# Patient Record
Sex: MALE | Race: OTHER
[De-identification: names, ages, dates, MRNs, and addresses within clinical notes are randomized per-mention and may not be internally consistent; named-entity substitution may affect disease eponyms.]

---

## 2018-09-26 ENCOUNTER — HOSPITAL ENCOUNTER (EMERGENCY)
Dept: HOSPITAL 72 - EMR | Age: 43
Discharge: HOME | End: 2018-09-26
Payer: SELF-PAY

## 2018-09-26 VITALS — WEIGHT: 178 LBS | BODY MASS INDEX: 26.98 KG/M2 | HEIGHT: 68 IN

## 2018-09-26 VITALS — DIASTOLIC BLOOD PRESSURE: 79 MMHG | SYSTOLIC BLOOD PRESSURE: 128 MMHG

## 2018-09-26 DIAGNOSIS — L03.116: Primary | ICD-10-CM

## 2018-09-26 LAB
ADD MANUAL DIFF: NO
ANION GAP SERPL CALC-SCNC: 4 MMOL/L (ref 5–15)
BASOPHILS NFR BLD AUTO: 0.7 % (ref 0–2)
BUN SERPL-MCNC: 15 MG/DL (ref 7–18)
CALCIUM SERPL-MCNC: 8.8 MG/DL (ref 8.5–10.1)
CHLORIDE SERPL-SCNC: 100 MMOL/L (ref 98–107)
CO2 SERPL-SCNC: 34 MMOL/L (ref 21–32)
CREAT SERPL-MCNC: 1 MG/DL (ref 0.55–1.3)
EOSINOPHIL NFR BLD AUTO: 2.7 % (ref 0–3)
ERYTHROCYTE [DISTWIDTH] IN BLOOD BY AUTOMATED COUNT: 10.8 % (ref 11.6–14.8)
HCT VFR BLD CALC: 46.2 % (ref 42–52)
HGB BLD-MCNC: 16 G/DL (ref 14.2–18)
LYMPHOCYTES NFR BLD AUTO: 16.9 % (ref 20–45)
MCV RBC AUTO: 88 FL (ref 80–99)
MONOCYTES NFR BLD AUTO: 11.4 % (ref 1–10)
NEUTROPHILS NFR BLD AUTO: 68.3 % (ref 45–75)
PLATELET # BLD: 337 K/UL (ref 150–450)
POTASSIUM SERPL-SCNC: 4.4 MMOL/L (ref 3.5–5.1)
RBC # BLD AUTO: 5.27 M/UL (ref 4.7–6.1)
SODIUM SERPL-SCNC: 138 MMOL/L (ref 136–145)
WBC # BLD AUTO: 13.8 K/UL (ref 4.8–10.8)

## 2018-09-26 PROCEDURE — 96375 TX/PRO/DX INJ NEW DRUG ADDON: CPT

## 2018-09-26 PROCEDURE — 96361 HYDRATE IV INFUSION ADD-ON: CPT

## 2018-09-26 PROCEDURE — 80048 BASIC METABOLIC PNL TOTAL CA: CPT

## 2018-09-26 PROCEDURE — 96365 THER/PROPH/DIAG IV INF INIT: CPT

## 2018-09-26 PROCEDURE — 99284 EMERGENCY DEPT VISIT MOD MDM: CPT

## 2018-09-26 PROCEDURE — 36415 COLL VENOUS BLD VENIPUNCTURE: CPT

## 2018-09-26 PROCEDURE — 85025 COMPLETE CBC W/AUTO DIFF WBC: CPT

## 2018-09-26 NOTE — EMERGENCY ROOM REPORT
History of Present Illness


General


Chief Complaint:  Lower Extremity Injury


Source:  Patient





Present Illness


HPI


Patient presents with complaints of swelling and redness to the left lower leg


He reports that about a week ago he felt a stinging and when he slapped his leg 

he had noticed a red chad


Feels that there was likely insect bite





Since then the area has continued to increase in size increased redness and 

there is also discharged from the region now





Denies any chest pain or shortness of breath denies any fevers denies any neck 

pain or photophobia


Allergies:  


Coded Allergies:  


     No Known Allergies (Unverified , 9/26/18)





Patient History


Past Medical History:  see triage record


Pertinent Family History:  none


Reviewed Nursing Documentation:  PMH: Agreed; PSxH: Agreed





Nursing Documentation-PMH


Past Medical History:  No Stated History





Review of Systems


All Other Systems:  negative except mentioned in HPI





Physical Exam





Vital Signs








  Date Time  Temp Pulse Resp B/P (MAP) Pulse Ox O2 Delivery O2 Flow Rate FiO2


 


9/26/18 03:33 97.5 102 22 124/76 100 Room Air  





 97.5       








Sp02 EP Interpretation:  reviewed, normal


General Appearance:  well appearing, no apparent distress


Head:  normocephalic, atraumatic


Eyes:  bilateral eye PERRL, bilateral eye EOMI


ENT:  normal pharynx


Neck:  full range of motion, supple


Respiratory:  lungs clear


Cardiovascular #1:  regular rate, rhythm


Gastrointestinal:  non tender


Musculoskeletal:  swelling - Swelling noted to the left lower extremity below 

the knee, there is a region of erythema as well in the mid proximal tibial 

region with open wound and drainage


Neurologic:  alert, oriented x3, responsive


Skin:  other - As above


Lymphatic:  no adenopathy





Medical Decision Making


Diagnostic Impression:  


 Primary Impression:  


 Cellulitis


ER Course


Given the patient's exam and findings IV is established baseline blood work is 

obtained and patient further hydrated


Antibiotics are provided


Patient's white blood cell count is mildly elevated consistent with the 

cellulitis clinically





There is no signs of any compartment syndrome at this time or any obvious 

abscess


Patient will have conservative outpatient trial and requires close outpatient 

follow-up





Labs








Test


  9/26/18


03:50


 


White Blood Count


  13.8 K/UL


(4.8-10.8)


 


Red Blood Count


  5.27 M/UL


(4.70-6.10)


 


Hemoglobin


  16.0 G/DL


(14.2-18.0)


 


Hematocrit


  46.2 %


(42.0-52.0)


 


Mean Corpuscular Volume 88 FL (80-99) 


 


Mean Corpuscular Hemoglobin


  30.4 PG


(27.0-31.0)


 


Mean Corpuscular Hemoglobin


Concent 34.6 G/DL


(32.0-36.0)


 


Red Cell Distribution Width


  10.8 %


(11.6-14.8)


 


Platelet Count


  337 K/UL


(150-450)


 


Mean Platelet Volume


  6.3 FL


(6.5-10.1)


 


Neutrophils (%) (Auto)


  68.3 %


(45.0-75.0)


 


Lymphocytes (%) (Auto)


  16.9 %


(20.0-45.0)


 


Monocytes (%) (Auto)


  11.4 %


(1.0-10.0)


 


Eosinophils (%) (Auto)


  2.7 %


(0.0-3.0)


 


Basophils (%) (Auto)


  0.7 %


(0.0-2.0)


 


Sodium Level


  138 MMOL/L


(136-145)


 


Potassium Level


  4.4 MMOL/L


(3.5-5.1)


 


Chloride Level


  100 MMOL/L


()


 


Carbon Dioxide Level


  34 MMOL/L


(21-32)


 


Anion Gap


  4 mmol/L


(5-15)


 


Blood Urea Nitrogen


  15 mg/dL


(7-18)


 


Creatinine


  1.0 MG/DL


(0.55-1.30)


 


Estimat Glomerular Filtration


Rate > 60 mL/min


(>60)


 


Glucose Level


  109 MG/DL


()


 


Calcium Level


  8.8 MG/DL


(8.5-10.1)











Last Vital Signs








  Date Time  Temp Pulse Resp B/P (MAP) Pulse Ox O2 Delivery O2 Flow Rate FiO2


 


9/26/18 03:33 97.5 102 22 124/76 100 Room Air  





 97.5       








Status:  improved


Disposition:  HOME, SELF-CARE


Condition:  Improved


Scripts


Ibuprofen* (MOTRIN*) 600 Mg Tablet


600 MG ORAL Q8H PRN for For Pain, #20 TAB 0 Refills


   Prov: Marlene Dye DO         9/26/18 


Trimethoprim/Sulfamethoxazole 160/800* (BACTRIM DS TABLET*) 1 Each Tablet


1 TAB ORAL Q12H, #20 TAB 0 Refills


   Prov: Marlene Dye DO         9/26/18 


Cephalexin* (KEFLEX*) 500 Mg Capsule


500 MG ORAL EVERY 6 HOURS for 10 Days, CAP


   Prov: Marlene Dye DO         9/26/18


Referrals:  


NOT CHOSEN [facility]/MD,REFERRING (PCP)





Additional Instructions:  


Patient is provided with the discharge instructions notified to follow up with 

primary doctor in the next 2-3 days otherwise return to the er with any 

worsening symptoms.


Please note that this report is being documented using DRAGON technology.  This 

can lead to erroneous entry secondary to incorrect interpretation by the 

dictating instrument.











Marlene Dye DO Sep 26, 2018 03:49

## 2020-12-24 ENCOUNTER — HOSPITAL ENCOUNTER (EMERGENCY)
Dept: HOSPITAL 72 - EMR | Age: 45
Discharge: HOME | End: 2020-12-24
Payer: SELF-PAY

## 2020-12-24 VITALS — BODY MASS INDEX: 27.15 KG/M2 | WEIGHT: 173 LBS | HEIGHT: 67 IN

## 2020-12-24 VITALS — DIASTOLIC BLOOD PRESSURE: 74 MMHG | SYSTOLIC BLOOD PRESSURE: 121 MMHG

## 2020-12-24 VITALS — SYSTOLIC BLOOD PRESSURE: 121 MMHG | DIASTOLIC BLOOD PRESSURE: 74 MMHG
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
## 2020-12-24 DIAGNOSIS — S71.151A: Primary | ICD-10-CM

## 2020-12-24 PROCEDURE — 99282 EMERGENCY DEPT VISIT SF MDM: CPT

## 2020-12-24 NOTE — NUR
ED Nurse Note:



Patient walked in to ER due to dog bite, that happened 2 days ago. Per patient 
he does not know the owner of that dog, he is a homless franklyn. Patient presented 
with old dry bloody wound on his right upper leg. Patient AAO x4, VSS at this 
time.

## 2020-12-24 NOTE — EMERGENCY ROOM REPORT
History of Present Illness


General


Chief Complaint:  Animal Bite


Source:  Patient





Present Illness


HPI


Disclaimer: Please note that this report is being documented using DRAGON 

technology. This can lead to erroneous entry secondary to incorrect 

interpretation by the dictating instrument.





HPI: 45-year-old male presents for evaluation of dog bite.  He states 2 days ago

he was walking down the street chatting with a person who lives in her car when 

he was attacked by their dog.  Was bitten on the right thigh and abrasions to 

the lower extremities bilaterally.  The patient states the owner of the dog 

reported his vaccinations were up-to-date.  Patient cleaned the wounds with 

peroxide and applied bandages.  This occurred 2 days ago.  No further bleeding. 

He states he is healing well.  Came in for antibiotics.


 


PMH: Denied


 


PSH: Denied


 


Allergies: Denied


 


Social Hx: Denied


Allergies:  


Coded Allergies:  


     No Known Allergies (Unverified , 9/26/18)





COVID-19 Screening


Contact w/high risk pt:  No


Experienced COVID-19 symptoms?:  No


COVID-19 Testing performed PTA:  No





Nursing Documentation-PMH


Past Medical History:  No Stated History





Review of Systems


All Other Systems:  negative except mentioned in HPI





Physical Exam





Vital Signs








  Date Time  Temp Pulse Resp B/P (MAP) Pulse Ox O2 Delivery O2 Flow Rate FiO2


 


12/24/20 12:22 98.1 82  121/74 (90)    





 





General: Awake and alert, no acute distress


HEENT: NC/AT. EOMI. 


Resp: Normal work of breathing


Skin: There is a healing abrasion over the lateral aspect of the right thigh.  

No puncture wound.  Multiple superficial scratches over the lower extremities.  

No lacerations for closure.


MSK: Normal tone and bulk. Moving all extremities.  No obvious deformity.


Neuro: Awake and alert.  Mentating appropriately





Medical Decision Making


Diagnostic Impression:  


   Primary Impression:  


   Dog bite


ER Course


45-year-old male presenting for evaluation of dog bite occurring 2 days ago.  

The vaccination status of the dog was reportedly up-to-date however this is 

unclear.  Will refer to public health department.  Will start on Augmentin.  

Wounds appear to be healing there is no need for closure.  Discussed wound care 

and need for reevaluation.  Discharged home.





Last Vital Signs








  Date Time  Temp Pulse Resp B/P (MAP) Pulse Ox O2 Delivery O2 Flow Rate FiO2


 


12/24/20 12:22 98.1 82  121/74 (90)    








Disposition:  HOME, SELF-CARE


Condition:  Stable


Scripts


Amoxicillin/Potassium Clav 875-125* (AUGMENTIN 875-125 TABLET*) 1 Each Tablet


1 TAB ORAL TWICE A DAY for 7 Days, #14 TAB


   Prov: Noble Rojo MD         12/24/20


Referrals:  


[facility]











[facility]


Patient Instructions:  Animal Bite





Additional Instructions:  


Follow-up with local health department to discuss rabies vaccination.  Please 

follow-up with your primary care doctor in the next 1 to 3 days to discuss this 

emergency department visit and for reevaluation.  If you have any new or 

worsening symptoms please return to the emergency department for reevaluation.  





Please note that this report is being documented using DRAGON technology.


This can lead to erroneous entry secondary to incorrect interpretation by the 

dictating instrument.











Noble Rojo MD              Dec 24, 2020 12:42